# Patient Record
(demographics unavailable — no encounter records)

---

## 2025-04-21 NOTE — HISTORY OF PRESENT ILLNESS
[Patient reported PAP Smear was normal] : Patient reported PAP Smear was normal [Y] : Patient is sexually active [Normal Amount/Duration] :  normal amount and duration [Regular Cycle Intervals] : periods have been regular [Frequency: Q ___ days] : menstrual periods occur approximately every [unfilled] days [Menarche Age: ____] : age at menarche was [unfilled] [Menstrual Cramps] : menstrual cramps [Currently Active] : currently active [Men] : men [No] : No [Yes] : Yes [Condoms] : Condoms [Patient refuses STI testing] : Patient refuses STI testing [PapSmeardate] : 5/9/2024 [LMPDate] : 4/2/2025 [MensesFreq] : 28-30 [MensesLength] : 3 [FreeTextEntry1] : 4/2/2025

## 2025-07-28 NOTE — HISTORY OF PRESENT ILLNESS
[Patient reported PAP Smear was normal] : Patient reported PAP Smear was normal [Normal Amount/Duration] :  normal amount and duration [Frequency: Q ___ days] : menstrual periods occur approximately every [unfilled] days [Menstrual Cramps] : menstrual cramps [Currently Active] : currently active [Men] : men [No] : No [Yes] : Yes [PapSmeardate] : 2024 [FreeTextEntry1] : 07/18/2025 [FreeTextEntry3] : IUD

## 2025-07-28 NOTE — PLAN
[FreeTextEntry1] :  Ms. DOMO BARRERA presents for IUD string check.  - IUD string visualized.  -  All questions answered.   Plan of care discussed with patient who has no additional questions and verbalized agreement.

## 2025-07-28 NOTE — PHYSICAL EXAM
[Chaperoned Physical Exam] : A chaperone was present in the examining room during all aspects of the physical examination. [MA] : MA [FreeTextEntry2] : Brooke [Appropriately responsive] : appropriately responsive [Alert] : alert [Labia Majora] : normal [Labia Minora] : normal [Normal] : normal [IUD String] : an IUD string was noted